# Patient Record
(demographics unavailable — no encounter records)

---

## 2025-05-20 NOTE — HEALTH RISK ASSESSMENT
[No] : In the past 12 months have you used drugs other than those required for medical reasons? No [No falls in past year] : Patient reported no falls in the past year [PHQ-2 Positive] : PHQ-2 Positive [PHQ-9 Positive] : PHQ-9 Positive [Patient reported colonoscopy was normal] : Patient reported colonoscopy was normal [HIV test declined] : HIV test declined [None] : None [With Family] : lives with family [] :  [Feels Safe at Home] : Feels safe at home [Fully functional (bathing, dressing, toileting, transferring, walking, feeding)] : Fully functional (bathing, dressing, toileting, transferring, walking, feeding) [Fully functional (using the telephone, shopping, preparing meals, housekeeping, doing laundry, using] : Fully functional and needs no help or supervision to perform IADLs (using the telephone, shopping, preparing meals, housekeeping, doing laundry, using transportation, managing medications and managing finances) [Smoke Detector] : smoke detector [Carbon Monoxide Detector] : carbon monoxide detector [Seat Belt] :  uses seat belt [Sunscreen] : uses sunscreen [1] : 2) Feeling down, depressed, or hopeless for several days (1) [Somewhat Difficult] : How difficult have these problems made it for you to do your work, take care of things at home, or get along with people? Somewhat difficult [Former] : Former [Retired] : retired [Patient/Caregiver not ready to engage] : , patient/caregiver not ready to engage [[id number]] : 2 [[id number]] : 7 [Change in mental status noted] : No change in mental status noted [Language] : denies difficulty with language [Behavior] : denies difficulty with behavior [Handling Complex Tasks] : denies difficulty handling complex tasks [Reasoning] : denies difficulty with reasoning [Sexually Active] : not sexually active [Reports changes in hearing] : Reports no changes in hearing [Reports changes in vision] : Reports no changes in vision [Reports changes in dental health] : Reports no changes in dental health [MammogramDate] : 2016 [MammogramComments] : Script given [PapSmearComments] : Dr. Cammie Rodriguez [BoneDensityComments] : Script given [ColonoscopyComments] : Dr. Rafael Barakat - ? date.   Previous - 01/2013 - Dr. Maricel Dukes [HepatitisCDate] : 03/15 [HepatitisCComments] : 3/23/2015 - negative [de-identified] : Audiology exam 2022 - mild high freq SNHL. [de-identified] : Nearsighted.  Goes to [facility].  formerly Dr. Ben Junior.  Has new ophthalmologist. [de-identified] : Doesn't go regularly. [AdvancecareDate] : 05/20/2025 [FreeTextEntry4] : Patient declines preparing a Health Care Proxy.

## 2025-05-20 NOTE — HISTORY OF PRESENT ILLNESS
[de-identified] : Patient presents for a follow-up annual physical.  Patient is a 73-year-old female with a history of PSVT (on a beta blocker), lumbar spinal stenosis, depression, migraines, osteopenia.  Patient developed severe depression several years ago.  Patient is now seeing Dr. Riggins (psychiatrist) and formerly Bonnie Hilton (psychology).  She is on Lexapro and mirtazapine.  She is off the alprazolam.  She has shaking at times and would like see Neurology due to a family history of Parkinson's (script was given previously and she didn't go).  She was in the ER 12/11/2023 for diverticulitis in the ascending colon.  She was seen by Dr. Dolores Jones (GI) Jan 2024 and was to have a colonoscopy.  She has been prescribed a beta blocker for her PSVTs but is not taking it.  She is not taking the rosuvastatin either.  She plans to return to Dr. Lisker.  Patient moved to [place] in March 2025.  She retired from working for a [employer] company.  Patient declines further COVID-19 boosters.  She declines all other vaccines (including further COVID-19 boosters).  Patient never had a COVID-19 infection to her knowledge since the beginning of the pandemic.  She does have loss of taste and smell.  She will see her ENT.

## 2025-05-20 NOTE — ASSESSMENT
[Vaccines Reviewed] : Immunizations reviewed today. Please see immunization details in the vaccine log within the immunization flowsheet.  [FreeTextEntry1] : (1) HCM - discussed diet, exercise, weight maintenance.  Labs drawn in office as below.  HIV testing declined.  Hepatitis C testing was negative 3/23/2015.  Patient reports 3 doses of the Pfizer COVID-19 vaccine and declines further boosters.  She declines influenza vaccination, Tdap, Shingrix, and pneumococcal vaccine (although she had a Pneumovax in 2010).  Indications, risks, benefits of these vaccines were reviewed.  Patient has not had a mammogram since 2016.  Patient urged to schedule screening mammogram and DEXA.  She has reached an age at which cervical cancer screening is typically completed, but it would be reasonable to have follow-up with her Gyn as well.  She had seen Dr. Jones (GI) Jan 2024 and was to have a colonoscopy but cancelled it.  Patient has now moved and requests a new GI doctor (referral given today).  Patient was reminded to have annual eye exams.  Patient declines preparing a Health Care Proxy.  (2) Depression - psychiatry follow-up for severe depression.  (3) CV - follow-up with Dr. Lisker for PSVT's.  (4) Ortho follow-up as needed for lumbar spondylosis and spinal stenosis.  (5)  - negative evaluation of microscopic hematuria by Dr. Schumacher in 2013.  (6) Neuro referral again given for tremor.